# Patient Record
Sex: FEMALE | Race: WHITE | NOT HISPANIC OR LATINO | Employment: FULL TIME | URBAN - METROPOLITAN AREA
[De-identification: names, ages, dates, MRNs, and addresses within clinical notes are randomized per-mention and may not be internally consistent; named-entity substitution may affect disease eponyms.]

---

## 2023-05-24 ENCOUNTER — TELEPHONE (OUTPATIENT)
Dept: PRIMARY CARE | Facility: CLINIC | Age: 33
End: 2023-05-24
Payer: COMMERCIAL

## 2023-06-19 ENCOUNTER — TELEPHONE (OUTPATIENT)
Dept: PRIMARY CARE | Facility: CLINIC | Age: 33
End: 2023-06-19
Payer: COMMERCIAL

## 2023-06-19 DIAGNOSIS — Z00.00 PHYSICAL EXAM: Primary | ICD-10-CM

## 2023-06-19 DIAGNOSIS — E55.9 VITAMIN D DEFICIENCY: ICD-10-CM

## 2023-06-19 DIAGNOSIS — E53.8 VITAMIN B 12 DEFICIENCY: ICD-10-CM

## 2023-06-19 PROBLEM — G89.29 CHRONIC BACK PAIN: Status: ACTIVE | Noted: 2023-06-19

## 2023-06-19 PROBLEM — R55 SYNCOPE: Status: ACTIVE | Noted: 2023-06-19

## 2023-06-19 PROBLEM — L70.9 ACNE: Status: ACTIVE | Noted: 2023-06-19

## 2023-06-19 PROBLEM — K64.9 HEMORRHOIDS: Status: ACTIVE | Noted: 2023-06-19

## 2023-06-19 PROBLEM — D72.819 LEUKOPENIA: Status: ACTIVE | Noted: 2023-06-19

## 2023-06-19 PROBLEM — M54.9 CHRONIC BACK PAIN: Status: ACTIVE | Noted: 2023-06-19

## 2023-06-19 NOTE — TELEPHONE ENCOUNTER
PATIENT HAS AN PHYSICAL ON 6/23/23 AND WOULD LIKE ;LABS ORDERED PRIOR TO THE APPT.      Orders placed in chart   AB

## 2023-06-21 ENCOUNTER — LAB (OUTPATIENT)
Dept: LAB | Facility: LAB | Age: 33
End: 2023-06-21
Payer: COMMERCIAL

## 2023-06-21 DIAGNOSIS — E55.9 VITAMIN D DEFICIENCY: ICD-10-CM

## 2023-06-21 DIAGNOSIS — E53.8 VITAMIN B 12 DEFICIENCY: ICD-10-CM

## 2023-06-21 DIAGNOSIS — Z00.00 PHYSICAL EXAM: ICD-10-CM

## 2023-06-21 LAB
ALANINE AMINOTRANSFERASE (SGPT) (U/L) IN SER/PLAS: 13 U/L (ref 7–45)
ALBUMIN (G/DL) IN SER/PLAS: 4 G/DL (ref 3.4–5)
ALKALINE PHOSPHATASE (U/L) IN SER/PLAS: 32 U/L (ref 33–110)
ANION GAP IN SER/PLAS: 10 MMOL/L (ref 10–20)
ASPARTATE AMINOTRANSFERASE (SGOT) (U/L) IN SER/PLAS: 15 U/L (ref 9–39)
BASOPHILS (10*3/UL) IN BLOOD BY AUTOMATED COUNT: 0.04 X10E9/L (ref 0–0.1)
BASOPHILS/100 LEUKOCYTES IN BLOOD BY AUTOMATED COUNT: 0.9 % (ref 0–2)
BILIRUBIN TOTAL (MG/DL) IN SER/PLAS: 1.5 MG/DL (ref 0–1.2)
CALCIUM (MG/DL) IN SER/PLAS: 8.9 MG/DL (ref 8.6–10.6)
CARBON DIOXIDE, TOTAL (MMOL/L) IN SER/PLAS: 27 MMOL/L (ref 21–32)
CHLORIDE (MMOL/L) IN SER/PLAS: 106 MMOL/L (ref 98–107)
CHOLESTEROL (MG/DL) IN SER/PLAS: 132 MG/DL (ref 0–199)
CHOLESTEROL IN HDL (MG/DL) IN SER/PLAS: 30.7 MG/DL
CHOLESTEROL/HDL RATIO: 4.3
CREATININE (MG/DL) IN SER/PLAS: 0.87 MG/DL (ref 0.5–1.05)
EOSINOPHILS (10*3/UL) IN BLOOD BY AUTOMATED COUNT: 0.04 X10E9/L (ref 0–0.7)
EOSINOPHILS/100 LEUKOCYTES IN BLOOD BY AUTOMATED COUNT: 0.9 % (ref 0–6)
ERYTHROCYTE DISTRIBUTION WIDTH (RATIO) BY AUTOMATED COUNT: 12.5 % (ref 11.5–14.5)
ERYTHROCYTE MEAN CORPUSCULAR HEMOGLOBIN CONCENTRATION (G/DL) BY AUTOMATED: 33.1 G/DL (ref 32–36)
ERYTHROCYTE MEAN CORPUSCULAR VOLUME (FL) BY AUTOMATED COUNT: 92 FL (ref 80–100)
ERYTHROCYTES (10*6/UL) IN BLOOD BY AUTOMATED COUNT: 4 X10E12/L (ref 4–5.2)
GFR FEMALE: 89 ML/MIN/1.73M2
GLUCOSE (MG/DL) IN SER/PLAS: 90 MG/DL (ref 74–99)
HEMATOCRIT (%) IN BLOOD BY AUTOMATED COUNT: 36.9 % (ref 36–46)
HEMOGLOBIN (G/DL) IN BLOOD: 12.2 G/DL (ref 12–16)
IMMATURE GRANULOCYTES/100 LEUKOCYTES IN BLOOD BY AUTOMATED COUNT: 0.2 % (ref 0–0.9)
LDL: 80 MG/DL (ref 0–99)
LEUKOCYTES (10*3/UL) IN BLOOD BY AUTOMATED COUNT: 4.4 X10E9/L (ref 4.4–11.3)
LYMPHOCYTES (10*3/UL) IN BLOOD BY AUTOMATED COUNT: 1.83 X10E9/L (ref 1.2–4.8)
LYMPHOCYTES/100 LEUKOCYTES IN BLOOD BY AUTOMATED COUNT: 42.1 % (ref 13–44)
MONOCYTES (10*3/UL) IN BLOOD BY AUTOMATED COUNT: 0.33 X10E9/L (ref 0.1–1)
MONOCYTES/100 LEUKOCYTES IN BLOOD BY AUTOMATED COUNT: 7.6 % (ref 2–10)
NEUTROPHILS (10*3/UL) IN BLOOD BY AUTOMATED COUNT: 2.1 X10E9/L (ref 1.2–7.7)
NEUTROPHILS/100 LEUKOCYTES IN BLOOD BY AUTOMATED COUNT: 48.3 % (ref 40–80)
NRBC (PER 100 WBCS) BY AUTOMATED COUNT: 0 /100 WBC (ref 0–0)
PLATELETS (10*3/UL) IN BLOOD AUTOMATED COUNT: 204 X10E9/L (ref 150–450)
POTASSIUM (MMOL/L) IN SER/PLAS: 4.4 MMOL/L (ref 3.5–5.3)
PROTEIN TOTAL: 6.8 G/DL (ref 6.4–8.2)
SODIUM (MMOL/L) IN SER/PLAS: 139 MMOL/L (ref 136–145)
TRIGLYCERIDE (MG/DL) IN SER/PLAS: 107 MG/DL (ref 0–149)
UREA NITROGEN (MG/DL) IN SER/PLAS: 16 MG/DL (ref 6–23)
VLDL: 21 MG/DL (ref 0–40)

## 2023-06-21 PROCEDURE — 85025 COMPLETE CBC W/AUTO DIFF WBC: CPT

## 2023-06-21 PROCEDURE — 82306 VITAMIN D 25 HYDROXY: CPT

## 2023-06-21 PROCEDURE — 36415 COLL VENOUS BLD VENIPUNCTURE: CPT

## 2023-06-21 PROCEDURE — 82607 VITAMIN B-12: CPT

## 2023-06-21 PROCEDURE — 84443 ASSAY THYROID STIM HORMONE: CPT

## 2023-06-21 PROCEDURE — 80053 COMPREHEN METABOLIC PANEL: CPT

## 2023-06-21 PROCEDURE — 80061 LIPID PANEL: CPT

## 2023-06-22 LAB
CALCIDIOL (25 OH VITAMIN D3) (NG/ML) IN SER/PLAS: 41 NG/ML
COBALAMIN (VITAMIN B12) (PG/ML) IN SER/PLAS: 303 PG/ML (ref 211–911)
THYROTROPIN (MIU/L) IN SER/PLAS BY DETECTION LIMIT <= 0.05 MIU/L: 1.25 MIU/L (ref 0.44–3.98)

## 2023-06-23 ENCOUNTER — OFFICE VISIT (OUTPATIENT)
Dept: PRIMARY CARE | Facility: CLINIC | Age: 33
End: 2023-06-23
Payer: COMMERCIAL

## 2023-06-23 VITALS
TEMPERATURE: 97.2 F | WEIGHT: 131 LBS | SYSTOLIC BLOOD PRESSURE: 109 MMHG | DIASTOLIC BLOOD PRESSURE: 70 MMHG | HEART RATE: 61 BPM | OXYGEN SATURATION: 100 % | BODY MASS INDEX: 22.31 KG/M2

## 2023-06-23 DIAGNOSIS — Z00.00 PHYSICAL EXAM: Primary | ICD-10-CM

## 2023-06-23 DIAGNOSIS — M79.629 AXILLARY PAIN, UNSPECIFIED LATERALITY: ICD-10-CM

## 2023-06-23 DIAGNOSIS — E53.8 VITAMIN B 12 DEFICIENCY: ICD-10-CM

## 2023-06-23 DIAGNOSIS — E55.9 VITAMIN D DEFICIENCY: ICD-10-CM

## 2023-06-23 PROBLEM — J32.9 SINUSITIS: Status: ACTIVE | Noted: 2023-06-23

## 2023-06-23 PROBLEM — R10.2 PELVIC PAIN IN FEMALE: Status: ACTIVE | Noted: 2023-06-23

## 2023-06-23 PROBLEM — R35.0 URINARY FREQUENCY: Status: ACTIVE | Noted: 2023-06-23

## 2023-06-23 PROBLEM — J06.9 URI (UPPER RESPIRATORY INFECTION): Status: ACTIVE | Noted: 2023-06-23

## 2023-06-23 PROBLEM — H61.20 CERUMEN IMPACTION: Status: ACTIVE | Noted: 2023-06-23

## 2023-06-23 PROBLEM — R05.3 COUGH, PERSISTENT: Status: ACTIVE | Noted: 2023-06-23

## 2023-06-23 PROBLEM — R05.3 CHRONIC COUGH: Status: ACTIVE | Noted: 2023-06-23

## 2023-06-23 PROBLEM — J45.998 POST VIRAL RAD (REACTIVE AIRWAY DISEASE) (HHS-HCC): Status: ACTIVE | Noted: 2023-06-23

## 2023-06-23 PROBLEM — M79.603 UPPER EXTREMITY PAIN: Status: ACTIVE | Noted: 2023-06-23

## 2023-06-23 PROBLEM — R39.9 UTI SYMPTOMS: Status: ACTIVE | Noted: 2023-06-23

## 2023-06-23 PROBLEM — N89.8 VAGINAL DISCHARGE: Status: ACTIVE | Noted: 2023-06-23

## 2023-06-23 PROCEDURE — 1036F TOBACCO NON-USER: CPT | Performed by: FAMILY MEDICINE

## 2023-06-23 PROCEDURE — 99395 PREV VISIT EST AGE 18-39: CPT | Performed by: FAMILY MEDICINE

## 2023-06-23 RX ORDER — DOCUSATE SODIUM 100 MG/1
CAPSULE, LIQUID FILLED ORAL 2 TIMES DAILY PRN
COMMUNITY
Start: 2021-09-03 | End: 2023-06-23 | Stop reason: ALTCHOICE

## 2023-06-23 RX ORDER — BNT162B2 ORIGINAL AND OMICRON BA.4/BA.5 .1125; .1125 MG/2.25ML; MG/2.25ML
INJECTION, SUSPENSION INTRAMUSCULAR
COMMUNITY
Start: 2022-10-19 | End: 2023-06-23 | Stop reason: ALTCHOICE

## 2023-06-23 RX ORDER — INFLUENZA A VIRUS A/DELAWARE/55/2019 CVR-45 (H1N1) ANTIGEN (MDCK CELL DERIVED, PROPIOLACTONE INACTIVATED), INFLUENZA A VIRUS A/DARWIN/11/2021 (H3N2) ANTIGEN (MDCK CELL DERIVED, PROPIOLACTONE INACTIVATED), INFLUENZA B VIRUS B/SINGAPORE/WUH4618/2021 ANTIGEN (MDCK CELL DERIVED, PROPIOLACTONE INACTIVATED), INFLUENZA B VIRUS B/SINGAPORE/INFTT-16-0610/2016 ANTIGEN (MDCK CELL DERIVED, PROPIOLACTONE INACTIVATED) 15; 15; 15; 15 UG/.5ML; UG/.5ML; UG/.5ML; UG/.5ML
INJECTION, SUSPENSION INTRAMUSCULAR
COMMUNITY
Start: 2022-10-19 | End: 2023-06-23 | Stop reason: ALTCHOICE

## 2023-06-23 RX ORDER — ALBUTEROL SULFATE 90 UG/1
AEROSOL, METERED RESPIRATORY (INHALATION)
COMMUNITY
Start: 2022-12-15 | End: 2023-06-23 | Stop reason: ALTCHOICE

## 2023-06-23 RX ORDER — ACETAMINOPHEN 325 MG/1
TABLET ORAL EVERY 6 HOURS
COMMUNITY
Start: 2021-09-03 | End: 2023-06-23 | Stop reason: ALTCHOICE

## 2023-06-23 RX ORDER — DROSPIRENONE AND ETHINYL ESTRADIOL 0.03MG-3MG
KIT ORAL
COMMUNITY
Start: 2016-10-10 | End: 2023-06-23 | Stop reason: ALTCHOICE

## 2023-06-23 RX ORDER — ETHYNODIOL DIACETATE AND ETHINYL ESTRADIOL 1 MG-35MCG
KIT ORAL
COMMUNITY
Start: 2023-06-20 | End: 2023-10-23 | Stop reason: SDUPTHER

## 2023-06-23 RX ORDER — IBUPROFEN/PSEUDOEPHEDRINE HCL 200MG-30MG
3 TABLET ORAL
COMMUNITY
Start: 2015-04-13 | End: 2023-06-23 | Stop reason: ALTCHOICE

## 2023-06-23 RX ORDER — HYDROCORTISONE ACETATE 25 MG/1
25 SUPPOSITORY RECTAL 2 TIMES DAILY
COMMUNITY
Start: 2016-12-22 | End: 2023-06-23 | Stop reason: ALTCHOICE

## 2023-06-23 NOTE — PROGRESS NOTES
Subjective   Patient ID: Komal Gregorio is a 33 y.o. female who presents for Annual Exam (PHYSICAL EXAM).    HPI   She has not been taking any multivitamins. She does not eat a lot of meat. She is only taking a birth control pill. She saw GYN 3 days ago. She is not a smoker and never has been. Her vit D level is normal, she is not on supplement.  Vit B12 level low normal, was on supplement in the past    She states that she does get pain in her armpits. She notes the pain is gone over the past month. She hasn't been lifting weight as much and her main focus is running. She has not felt any lumps or swelling.  Goes away when not working out as much.  Had recent breast exam at gyn which was normal and normal exam today with me.       Review of Systems    Objective   /70   Pulse 61   Temp 36.2 °C (97.2 °F)   Wt 59.4 kg (131 lb)   SpO2 100%   BMI 22.31 kg/m²     Physical Exam  Constitutional:       Appearance: Normal appearance.   HENT:      Head: Normocephalic.      Right Ear: Tympanic membrane normal.      Left Ear: Tympanic membrane normal.      Mouth/Throat:      Pharynx: Oropharynx is clear.   Eyes:      Extraocular Movements: Extraocular movements intact.      Conjunctiva/sclera: Conjunctivae normal.      Pupils: Pupils are equal, round, and reactive to light.   Cardiovascular:      Rate and Rhythm: Normal rate and regular rhythm.      Pulses: Normal pulses.      Heart sounds: Normal heart sounds.   Pulmonary:      Effort: Pulmonary effort is normal.      Breath sounds: Normal breath sounds.   Abdominal:      General: Bowel sounds are normal.      Palpations: Abdomen is soft. There is no mass.      Tenderness: There is no abdominal tenderness.   Musculoskeletal:         General: Normal range of motion.      Cervical back: Normal range of motion and neck supple.      Right lower leg: No edema.      Left lower leg: No edema.   Skin:     General: Skin is warm and dry.   Neurological:       General: No focal deficit present.      Mental Status: She is alert and oriented to person, place, and time.   Psychiatric:         Mood and Affect: Mood normal.         Thought Content: Thought content normal.         Judgment: Judgment normal.     No axillary adenopathy or mass.  No swelling.  Asymptomatic today, no tenderness    Assessment/Plan   Problem List Items Addressed This Visit    Reviewed blood work with from 6/21/23 normal labs    -Start taking a multivitamin.   -Start a B complex  or B12 vitamin a few times a week.   -Wear a supportive bra when running and see if helps pain in axilla.   -Follow up in 1 year or prn sooner.   Problem List Items Addressed This Visit       Vitamin B 12 deficiency    Vitamin D deficiency    Axillary pain, unspecified laterality     Other Visit Diagnoses       Physical exam    -  Primary            Scribe Attestation  By signing my name below, IRomana , Scribe   attest that this documentation has been prepared under the direction and in the presence of Shae Nuñez DO.

## 2023-06-23 NOTE — PATIENT INSTRUCTIONS
-Start taking a multivitamin.   -Start a B complex vitamin a few times a week.   -Wear a supportive bra.   -Follow up in 1 year.

## 2023-10-23 DIAGNOSIS — Z30.41 ENCOUNTER FOR SURVEILLANCE OF CONTRACEPTIVE PILLS: Primary | ICD-10-CM

## 2023-10-23 RX ORDER — ETHYNODIOL DIACETATE AND ETHINYL ESTRADIOL 1 MG-35MCG
1 KIT ORAL DAILY
Qty: 84 TABLET | Refills: 3 | Status: SHIPPED | OUTPATIENT
Start: 2023-10-23 | End: 2024-01-08 | Stop reason: SDUPTHER

## 2023-11-10 ENCOUNTER — TELEPHONE (OUTPATIENT)
Dept: PRIMARY CARE | Facility: CLINIC | Age: 33
End: 2023-11-10
Payer: COMMERCIAL

## 2023-11-10 DIAGNOSIS — K64.9 HEMORRHOIDS, UNSPECIFIED HEMORRHOID TYPE: Primary | ICD-10-CM

## 2023-11-10 NOTE — TELEPHONE ENCOUNTER
Patient would like referral for gastroenterologist for her hemorrhoids she would like a female doctor if possible.

## 2023-11-15 ENCOUNTER — TELEPHONE (OUTPATIENT)
Dept: PRIMARY CARE | Facility: CLINIC | Age: 33
End: 2023-11-15
Payer: COMMERCIAL

## 2023-11-15 DIAGNOSIS — K64.9 ACUTE HEMORRHOID: ICD-10-CM

## 2023-11-15 NOTE — TELEPHONE ENCOUNTER
Pt called about another gastro referral, the provider you referred her to isn't taking anymore new pts. Put another referral to gastro in her chart and provided her with central scheduling's number to call. Told her to call back if has any other issues.

## 2023-11-28 ENCOUNTER — OFFICE VISIT (OUTPATIENT)
Dept: GASTROENTEROLOGY | Facility: CLINIC | Age: 33
End: 2023-11-28
Payer: COMMERCIAL

## 2023-11-28 ENCOUNTER — APPOINTMENT (OUTPATIENT)
Dept: GASTROENTEROLOGY | Facility: CLINIC | Age: 33
End: 2023-11-28
Payer: COMMERCIAL

## 2023-11-28 VITALS — BODY MASS INDEX: 22.71 KG/M2 | HEIGHT: 64 IN | WEIGHT: 133 LBS | HEART RATE: 78 BPM

## 2023-11-28 DIAGNOSIS — K60.2 ANAL FISSURE: Primary | ICD-10-CM

## 2023-11-28 PROCEDURE — 99214 OFFICE O/P EST MOD 30 MIN: CPT | Performed by: INTERNAL MEDICINE

## 2023-11-28 PROCEDURE — 1036F TOBACCO NON-USER: CPT | Performed by: INTERNAL MEDICINE

## 2023-11-28 RX ORDER — SPIRONOLACTONE 25 MG/1
TABLET ORAL
COMMUNITY
Start: 2023-09-27

## 2023-11-28 RX ORDER — TRETINOIN 0.5 MG/G
CREAM TOPICAL
COMMUNITY
Start: 2023-09-27

## 2023-11-28 RX ORDER — NITROGLYCERIN 4 MG/G
1 OINTMENT RECTAL EVERY 12 HOURS SCHEDULED
Qty: 30 G | Refills: 1 | Status: SHIPPED | OUTPATIENT
Start: 2023-11-28

## 2023-11-28 NOTE — PROGRESS NOTES
Subjective     History of Present Illness:   Komal Gregorio is a 33 y.o. female with PMHx of hemorrhoids who presents to GI clinic for follow up.  Has anal pain with BM's, occasional BRB on stool, has anal/rectal pain after intercourse.  Stools are variable in consistency, has BM daily, eats fiber, took Miralax which gave her loose stools, abd pain.  Good appetite, no weight loss.     Review of Systems  Review of Systems    Allergies  Allergies   Allergen Reactions   • Other Other       Medications  Current Outpatient Medications   Medication Instructions   • Kelnor 1/35, 28, 1-35 mg-mcg tablet 1 tablet, oral, Daily   • spironolactone (Aldactone) 25 mg tablet TAKE 1 TO 2 TABLETS BY MOUTH EVERY DAY AS TOLERATED   • tretinoin (Retin-A) 0.05 % cream APPLY TO AFFECTED AREA OF ACNE AT BEDTIME        Objective   Visit Vitals  Pulse 78      Physical Exam    100/62  Lungs clear  Cv  rrr, no mrg  Abd soft nontender  Rectal - visible fissure in posterior anal canal - no mass on digital exam      Lab Results   Component Value Date    WBC 4.4 06/21/2023    WBC 5.2 07/28/2022    WBC 4.4 12/23/2021    HGB 12.2 06/21/2023    HGB 13.3 07/28/2022    HGB 13.3 12/23/2021    HCT 36.9 06/21/2023    HCT 41.1 07/28/2022    HCT 39.2 12/23/2021     06/21/2023     07/28/2022     12/23/2021     Lab Results   Component Value Date     06/21/2023     07/28/2022     12/23/2021    K 4.4 06/21/2023    K 4.3 07/28/2022    K 4.0 12/23/2021     06/21/2023     (H) 07/28/2022     12/23/2021    CO2 27 06/21/2023    CO2 25 07/28/2022    CO2 29 12/23/2021    BUN 16 06/21/2023    BUN 13 07/28/2022    BUN 18 12/23/2021    CREATININE 0.87 06/21/2023    CREATININE 0.83 07/28/2022    CREATININE 0.83 12/23/2021    CALCIUM 8.9 06/21/2023    CALCIUM 8.9 07/28/2022    CALCIUM 9.1 12/23/2021    PROT 6.8 06/21/2023    PROT 6.9 07/28/2022    PROT 6.9 12/23/2021    BILITOT 1.5 (H) 06/21/2023     BILITOT 1.7 (H) 07/28/2022    BILITOT 1.9 (H) 12/23/2021    ALKPHOS 32 (L) 06/21/2023    ALKPHOS 34 07/28/2022    ALKPHOS 55 12/23/2021    ALT 13 06/21/2023    ALT 9 07/28/2022    ALT 14 12/23/2021    AST 15 06/21/2023    AST 15 07/28/2022    AST 16 12/23/2021    GLUCOSE 90 06/21/2023    GLUCOSE 85 07/28/2022    GLUCOSE 85 12/23/2021    CHOL 132 06/21/2023    CHOL 138 07/28/2022    CHOL 187 12/23/2021    LDLF 80 06/21/2023    LDLF 75 07/28/2022    LDLF 124 (H) 12/23/2021    CHHDL 4.3 06/21/2023    CHHDL 3.8 07/28/2022    CHHDL 4.5 12/23/2021     XR chest 2 view  MRN: [id number]  Patient Name: EMBER OSORIO     STUDY:  TH CHEST 2 VIEW PA AND LAT     INDICATION:  cough for 3 weeks  R05.3: Cough, persistent.     COMPARISON:  May 22, 2017     ACCESSION NUMBER(S):  [id number]     ORDERING CLINICIAN:  MAYA RIVERA     FINDINGS:  No consolidation, effusion, edema, or pneumothorax. Heart size within  normal limits.     IMPRESSION:  No evidence of acute intrathoracic abnormality.           Ember Gregorio is a 33 y.o. female for follow up of anal pain, bleeding - has fissure on exam - will rx with Rectiv cream, return in 2-3 weeks       Aiden Palomares MD

## 2023-12-05 ENCOUNTER — TELEPHONE (OUTPATIENT)
Dept: GASTROENTEROLOGY | Facility: CLINIC | Age: 33
End: 2023-12-05
Payer: COMMERCIAL

## 2023-12-05 NOTE — TELEPHONE ENCOUNTER
----- Message from Miriam Schultz MA sent at 12/5/2023 11:09 AM EST -----  Please call her medication that you called in is too expensive @ [phone]

## 2023-12-07 ENCOUNTER — TELEPHONE (OUTPATIENT)
Dept: GASTROENTEROLOGY | Facility: CLINIC | Age: 33
End: 2023-12-07
Payer: COMMERCIAL

## 2023-12-07 NOTE — TELEPHONE ENCOUNTER
----- Message from Juanita Loaiza CMA sent at 12/7/2023  2:30 PM EST -----  You called in Nifedipine for her, the pharmacist stated it is compounded and they can't do, so either change drug or pharmacy

## 2023-12-07 NOTE — TELEPHONE ENCOUNTER
Returned her call, left msg - [facility]

## 2023-12-12 ENCOUNTER — APPOINTMENT (OUTPATIENT)
Dept: GASTROENTEROLOGY | Facility: CLINIC | Age: 33
End: 2023-12-12
Payer: COMMERCIAL

## 2024-01-02 ENCOUNTER — OFFICE VISIT (OUTPATIENT)
Dept: GASTROENTEROLOGY | Facility: CLINIC | Age: 34
End: 2024-01-02
Payer: COMMERCIAL

## 2024-01-02 VITALS — HEIGHT: 64 IN | WEIGHT: 140 LBS | HEART RATE: 60 BPM | BODY MASS INDEX: 23.9 KG/M2

## 2024-01-02 DIAGNOSIS — K59.00 CONSTIPATION, UNSPECIFIED CONSTIPATION TYPE: Primary | ICD-10-CM

## 2024-01-02 DIAGNOSIS — K60.2 ANAL FISSURE: ICD-10-CM

## 2024-01-02 PROCEDURE — 1036F TOBACCO NON-USER: CPT | Performed by: INTERNAL MEDICINE

## 2024-01-02 PROCEDURE — 99214 OFFICE O/P EST MOD 30 MIN: CPT | Performed by: INTERNAL MEDICINE

## 2024-01-02 RX ORDER — DOCUSATE SODIUM 100 MG/1
100 CAPSULE, LIQUID FILLED ORAL 2 TIMES DAILY
Qty: 60 CAPSULE | Refills: 2 | Status: SHIPPED | OUTPATIENT
Start: 2024-01-02 | End: 2024-04-19

## 2024-01-02 NOTE — PROGRESS NOTES
Subjective     History of Present Illness:   Komal Gregorio is a 33 y.o. female with PMHx of anal fissure  who presents to GI clinic for follow up.   Overall having less pain - had 2 episodes of pain since - with harder BM - has had bleeding - had BRB on TP - stools sometimes hard to pass - tried Miralax in past, but caused stomach pains -     Review of Systems  Review of Systems    Allergies  No Known Allergies    Medications  Current Outpatient Medications   Medication Instructions    Kelnor 1/35, 28, 1-35 mg-mcg tablet 1 tablet, oral, Daily    nitroglycerin (Rectiv) 0.4 % (w/w) rectal ointment 1 Application, rectal, Every 12 hours scheduled    spironolactone (Aldactone) 25 mg tablet TAKE 1 TO 2 TABLETS BY MOUTH EVERY DAY AS TOLERATED    tretinoin (Retin-A) 0.05 % cream APPLY TO AFFECTED AREA OF ACNE AT BEDTIME        Objective   Visit Vitals  Pulse 60      Physical Exam  Chaperone declined  100/62  Lungs clear  CV rrr, no mrg  Abd - soft nontender  Anus - healing posterior fissure - hemorrhoids visible in anal canal       Lab Results   Component Value Date    WBC 4.4 06/21/2023    WBC 5.2 07/28/2022    WBC 4.4 12/23/2021    HGB 12.2 06/21/2023    HGB 13.3 07/28/2022    HGB 13.3 12/23/2021    HCT 36.9 06/21/2023    HCT 41.1 07/28/2022    HCT 39.2 12/23/2021     06/21/2023     07/28/2022     12/23/2021     Lab Results   Component Value Date     06/21/2023     07/28/2022     12/23/2021    K 4.4 06/21/2023    K 4.3 07/28/2022    K 4.0 12/23/2021     06/21/2023     (H) 07/28/2022     12/23/2021    CO2 27 06/21/2023    CO2 25 07/28/2022    CO2 29 12/23/2021    BUN 16 06/21/2023    BUN 13 07/28/2022    BUN 18 12/23/2021    CREATININE 0.87 06/21/2023    CREATININE 0.83 07/28/2022    CREATININE 0.83 12/23/2021    CALCIUM 8.9 06/21/2023    CALCIUM 8.9 07/28/2022    CALCIUM 9.1 12/23/2021    PROT 6.8 06/21/2023    PROT 6.9 07/28/2022    PROT 6.9  12/23/2021    BILITOT 1.5 (H) 06/21/2023    BILITOT 1.7 (H) 07/28/2022    BILITOT 1.9 (H) 12/23/2021    ALKPHOS 32 (L) 06/21/2023    ALKPHOS 34 07/28/2022    ALKPHOS 55 12/23/2021    ALT 13 06/21/2023    ALT 9 07/28/2022    ALT 14 12/23/2021    AST 15 06/21/2023    AST 15 07/28/2022    AST 16 12/23/2021    GLUCOSE 90 06/21/2023    GLUCOSE 85 07/28/2022    GLUCOSE 85 12/23/2021    CHOL 132 06/21/2023    CHOL 138 07/28/2022    CHOL 187 12/23/2021    LDLF 80 06/21/2023    LDLF 75 07/28/2022    LDLF 124 (H) 12/23/2021    CHHDL 4.3 06/21/2023    CHHDL 3.8 07/28/2022    CHHDL 4.5 12/23/2021     XR chest 2 view  MRN: [id number]  Patient Name: EMBER OSORIO     STUDY:  TH CHEST 2 VIEW PA AND LAT     INDICATION:  cough for 3 weeks  R05.3: Cough, persistent.     COMPARISON:  May 22, 2017     ACCESSION NUMBER(S):  [id number]     ORDERING CLINICIAN:  MAYA RIVERA     FINDINGS:  No consolidation, effusion, edema, or pneumothorax. Heart size within  normal limits.     IMPRESSION:  No evidence of acute intrathoracic abnormality.           Ember Gregorio is a 33 y.o. female for follow up of anal fissure - some improvement with use of nifedipine cream, but still with some episodes of bleeding, pain x 2 episodes - will continue nifedipine, add colace 100 mg bid, increase fiber and fluid intake, return in 1 month - discussed possible need for Rx of hemorrhoids       Aiden Palomares MD

## 2024-01-08 DIAGNOSIS — Z30.41 ENCOUNTER FOR SURVEILLANCE OF CONTRACEPTIVE PILLS: ICD-10-CM

## 2024-01-08 RX ORDER — ETHYNODIOL DIACETATE AND ETHINYL ESTRADIOL 1 MG-35MCG
1 KIT ORAL DAILY
Qty: 84 TABLET | Refills: 3 | Status: SHIPPED | OUTPATIENT
Start: 2024-01-08 | End: 2024-02-28 | Stop reason: SDUPTHER

## 2024-02-13 ENCOUNTER — OFFICE VISIT (OUTPATIENT)
Dept: GASTROENTEROLOGY | Facility: CLINIC | Age: 34
End: 2024-02-13
Payer: COMMERCIAL

## 2024-02-13 VITALS — HEART RATE: 73 BPM | BODY MASS INDEX: 22.49 KG/M2 | HEIGHT: 65 IN | WEIGHT: 135 LBS

## 2024-02-13 DIAGNOSIS — K64.9 HEMORRHOIDS, UNSPECIFIED HEMORRHOID TYPE: Primary | ICD-10-CM

## 2024-02-13 PROCEDURE — 1036F TOBACCO NON-USER: CPT | Performed by: INTERNAL MEDICINE

## 2024-02-13 PROCEDURE — 99214 OFFICE O/P EST MOD 30 MIN: CPT | Performed by: INTERNAL MEDICINE

## 2024-02-13 RX ORDER — HYDROCORTISONE ACETATE 25 MG/1
25 SUPPOSITORY RECTAL DAILY
Qty: 24 SUPPOSITORY | Refills: 1 | Status: SHIPPED | OUTPATIENT
Start: 2024-02-13 | End: 2025-02-12

## 2024-02-13 NOTE — PROGRESS NOTES
"Subjective     History of Present Illness:   Komal Gregorio is a 34 y.o. female with PMHx of anal fissure who presents to GI clinic for follow up - was doing better, with less pain, less bleeding, stools easier to pass on Colace - then had \"GI bug\",with diarrhea, then \"something popped out\", so she applied steroid cream - not much bleeding - still with spasms after sexual intercourse    Review of Systems  Review of Systems    Allergies  No Known Allergies    Medications  Current Outpatient Medications   Medication Instructions    docusate sodium (COLACE) 100 mg, oral, 2 times daily    Kelnor 1/35, 28, 1-35 mg-mcg tablet 1 tablet, oral, Daily    nitroglycerin (Rectiv) 0.4 % (w/w) rectal ointment 1 Application, rectal, Every 12 hours scheduled    spironolactone (Aldactone) 25 mg tablet TAKE 1 TO 2 TABLETS BY MOUTH EVERY DAY AS TOLERATED    tretinoin (Retin-A) 0.05 % cream APPLY TO AFFECTED AREA OF ACNE AT BEDTIME        Objective   Visit Vitals  Pulse 73      Physical Exam  Chaperone declined  BP 92/60  Abd - soft, nontender  Rectal - fissure healed - hemorrhoids visible in anal canal -nontender on digital exam, no nodule palpable     Lab Results   Component Value Date    WBC 4.4 06/21/2023    WBC 5.2 07/28/2022    WBC 4.4 12/23/2021    HGB 12.2 06/21/2023    HGB 13.3 07/28/2022    HGB 13.3 12/23/2021    HCT 36.9 06/21/2023    HCT 41.1 07/28/2022    HCT 39.2 12/23/2021     06/21/2023     07/28/2022     12/23/2021     Lab Results   Component Value Date     06/21/2023     07/28/2022     12/23/2021    K 4.4 06/21/2023    K 4.3 07/28/2022    K 4.0 12/23/2021     06/21/2023     (H) 07/28/2022     12/23/2021    CO2 27 06/21/2023    CO2 25 07/28/2022    CO2 29 12/23/2021    BUN 16 06/21/2023    BUN 13 07/28/2022    BUN 18 12/23/2021    CREATININE 0.87 06/21/2023    CREATININE 0.83 07/28/2022    CREATININE 0.83 12/23/2021    CALCIUM 8.9 06/21/2023    " CALCIUM 8.9 07/28/2022    CALCIUM 9.1 12/23/2021    PROT 6.8 06/21/2023    PROT 6.9 07/28/2022    PROT 6.9 12/23/2021    BILITOT 1.5 (H) 06/21/2023    BILITOT 1.7 (H) 07/28/2022    BILITOT 1.9 (H) 12/23/2021    ALKPHOS 32 (L) 06/21/2023    ALKPHOS 34 07/28/2022    ALKPHOS 55 12/23/2021    ALT 13 06/21/2023    ALT 9 07/28/2022    ALT 14 12/23/2021    AST 15 06/21/2023    AST 15 07/28/2022    AST 16 12/23/2021    GLUCOSE 90 06/21/2023    GLUCOSE 85 07/28/2022    GLUCOSE 85 12/23/2021    CHOL 132 06/21/2023    CHOL 138 07/28/2022    CHOL 187 12/23/2021    LDLF 80 06/21/2023    LDLF 75 07/28/2022    LDLF 124 (H) 12/23/2021    CHHDL 4.3 06/21/2023    CHHDL 3.8 07/28/2022    CHHDL 4.5 12/23/2021     XR chest 2 view  MRN: [id number]  Patient Name: EMBER OSORIO     STUDY:  TH CHEST 2 VIEW PA AND LAT     INDICATION:  cough for 3 weeks  R05.3: Cough, persistent.     COMPARISON:  May 22, 2017     ACCESSION NUMBER(S):  [id number]     ORDERING CLINICIAN:  MAYA RIVERA     FINDINGS:  No consolidation, effusion, edema, or pneumothorax. Heart size within  normal limits.     IMPRESSION:  No evidence of acute intrathoracic abnormality.           Ember Gregorio is a 34 y.o. female for follow up of anal fissure - now healed, but now with hemorrhoids - will rx with anucort hc supp x 3 weeks, follow up appt in march       Aiden Palomares MD

## 2024-02-28 DIAGNOSIS — Z30.41 ENCOUNTER FOR SURVEILLANCE OF CONTRACEPTIVE PILLS: ICD-10-CM

## 2024-02-28 RX ORDER — ETHYNODIOL DIACETATE AND ETHINYL ESTRADIOL 1 MG-35MCG
1 KIT ORAL DAILY
Qty: 84 TABLET | Refills: 3 | Status: SHIPPED | OUTPATIENT
Start: 2024-02-28

## 2024-03-11 ENCOUNTER — OFFICE VISIT (OUTPATIENT)
Dept: GASTROENTEROLOGY | Facility: CLINIC | Age: 34
End: 2024-03-11
Payer: COMMERCIAL

## 2024-03-11 VITALS — HEART RATE: 74 BPM | BODY MASS INDEX: 23.73 KG/M2 | WEIGHT: 139 LBS | HEIGHT: 64 IN

## 2024-03-11 DIAGNOSIS — K60.2 ANAL FISSURE: Primary | ICD-10-CM

## 2024-03-11 PROCEDURE — 99214 OFFICE O/P EST MOD 30 MIN: CPT | Performed by: INTERNAL MEDICINE

## 2024-03-11 PROCEDURE — 1036F TOBACCO NON-USER: CPT | Performed by: INTERNAL MEDICINE

## 2024-03-11 RX ORDER — CLINDAMYCIN PHOSPHATE, BENZOYL PEROXIDE 25; 10 MG/G; MG/G
1 GEL TOPICAL 2 TIMES DAILY
COMMUNITY
Start: 2024-02-23

## 2024-03-11 NOTE — PROGRESS NOTES
Subjective     History of Present Illness:   Komal Gregorio is a 34 y.o. female with PMHx of anal fissure who presents to GI clinic for follow up.  Used suppositories, now with no anal pain, no bleeding.  Some decrease in stool frequency, now every 2-3 days, stools slightly harder.    Review of Systems  Review of Systems    Allergies  No Known Allergies    Medications  Current Outpatient Medications   Medication Instructions    clindamycin-benzoyl peroxide gel 1 Application, Topical, 2 times daily    docusate sodium (COLACE) 100 mg, oral, 2 times daily    hydrocortisone (ANUSOL-HC) 25 mg, rectal, Daily, Take prior to procedure as directed    Ezekiel 1/35, 28, 1-35 mg-mcg tablet 1 tablet, oral, Daily    nitroglycerin (Rectiv) 0.4 % (w/w) rectal ointment 1 Application, rectal, Every 12 hours scheduled    spironolactone (Aldactone) 25 mg tablet TAKE 1 TO 2 TABLETS BY MOUTH EVERY DAY AS TOLERATED    tretinoin (Retin-A) 0.05 % cream APPLY TO AFFECTED AREA OF ACNE AT BEDTIME        Objective   Visit Vitals  Pulse 74      Physical Exam  Chaperone declined again  BP 90/60    Lungs clear  CV rrr, no mrg  Abd soft, nontender  Rectal - healed fissure, very small hemorrhoids in anal canal       Lab Results   Component Value Date    WBC 4.4 06/21/2023    WBC 5.2 07/28/2022    WBC 4.4 12/23/2021    HGB 12.2 06/21/2023    HGB 13.3 07/28/2022    HGB 13.3 12/23/2021    HCT 36.9 06/21/2023    HCT 41.1 07/28/2022    HCT 39.2 12/23/2021     06/21/2023     07/28/2022     12/23/2021     Lab Results   Component Value Date     06/21/2023     07/28/2022     12/23/2021    K 4.4 06/21/2023    K 4.3 07/28/2022    K 4.0 12/23/2021     06/21/2023     (H) 07/28/2022     12/23/2021    CO2 27 06/21/2023    CO2 25 07/28/2022    CO2 29 12/23/2021    BUN 16 06/21/2023    BUN 13 07/28/2022    BUN 18 12/23/2021    CREATININE 0.87 06/21/2023    CREATININE 0.83 07/28/2022     CREATININE 0.83 12/23/2021    CALCIUM 8.9 06/21/2023    CALCIUM 8.9 07/28/2022    CALCIUM 9.1 12/23/2021    PROT 6.8 06/21/2023    PROT 6.9 07/28/2022    PROT 6.9 12/23/2021    BILITOT 1.5 (H) 06/21/2023    BILITOT 1.7 (H) 07/28/2022    BILITOT 1.9 (H) 12/23/2021    ALKPHOS 32 (L) 06/21/2023    ALKPHOS 34 07/28/2022    ALKPHOS 55 12/23/2021    ALT 13 06/21/2023    ALT 9 07/28/2022    ALT 14 12/23/2021    AST 15 06/21/2023    AST 15 07/28/2022    AST 16 12/23/2021    GLUCOSE 90 06/21/2023    GLUCOSE 85 07/28/2022    GLUCOSE 85 12/23/2021    CHOL 132 06/21/2023    CHOL 138 07/28/2022    CHOL 187 12/23/2021    LDLF 80 06/21/2023    LDLF 75 07/28/2022    LDLF 124 (H) 12/23/2021    CHHDL 4.3 06/21/2023    CHHDL 3.8 07/28/2022    CHHDL 4.5 12/23/2021     XR chest 2 view  MRN: [id number]  Patient Name: EMBER OSORIO     STUDY:  TH CHEST 2 VIEW PA AND LAT     INDICATION:  cough for 3 weeks  R05.3: Cough, persistent.     COMPARISON:  May 22, 2017     ACCESSION NUMBER(S):  [id number]     ORDERING CLINICIAN:  MAYA RIVERA     FINDINGS:  No consolidation, effusion, edema, or pneumothorax. Heart size within  normal limits.     IMPRESSION:  No evidence of acute intrathoracic abnormality.           Ember Gregorio is a 34 y.o. female for follow up of fissure and hemorrhoids.   Overall better, with no anal pain or bleeding.   Will increase Colace to 3/day to soften stools.  Call me if pain or bleeding recur      Aiden Palomares MD

## 2024-04-19 DIAGNOSIS — K59.00 CONSTIPATION, UNSPECIFIED CONSTIPATION TYPE: ICD-10-CM

## 2024-04-19 RX ORDER — DOCUSATE SODIUM 100 MG/1
100 CAPSULE, LIQUID FILLED ORAL 2 TIMES DAILY
Qty: 180 CAPSULE | Refills: 2 | Status: SHIPPED | OUTPATIENT
Start: 2024-04-19

## 2024-05-03 ENCOUNTER — TELEPHONE (OUTPATIENT)
Dept: OBSTETRICS AND GYNECOLOGY | Facility: CLINIC | Age: 34
End: 2024-05-03
Payer: COMMERCIAL

## 2024-05-03 DIAGNOSIS — R39.9 UTI SYMPTOMS: Primary | ICD-10-CM

## 2024-05-03 RX ORDER — NITROFURANTOIN 25; 75 MG/1; MG/1
100 CAPSULE ORAL 2 TIMES DAILY
Qty: 14 CAPSULE | Refills: 0 | Status: SHIPPED | OUTPATIENT
Start: 2024-05-03 | End: 2024-05-10

## 2024-05-03 NOTE — TELEPHONE ENCOUNTER
Pt would like something called into pharmacy for uti symptoms.  Please advise thank you.  Michelle Levine MA

## 2024-06-25 ENCOUNTER — APPOINTMENT (OUTPATIENT)
Dept: OBSTETRICS AND GYNECOLOGY | Facility: CLINIC | Age: 34
End: 2024-06-25
Payer: COMMERCIAL

## 2024-06-25 VITALS — BODY MASS INDEX: 23.86 KG/M2 | WEIGHT: 139 LBS | DIASTOLIC BLOOD PRESSURE: 74 MMHG | SYSTOLIC BLOOD PRESSURE: 104 MMHG

## 2024-06-25 DIAGNOSIS — Z30.41 ENCOUNTER FOR SURVEILLANCE OF CONTRACEPTIVE PILLS: ICD-10-CM

## 2024-06-25 DIAGNOSIS — R35.0 URINARY FREQUENCY: ICD-10-CM

## 2024-06-25 DIAGNOSIS — Z01.419 ENCOUNTER FOR GYNECOLOGICAL EXAMINATION WITHOUT ABNORMAL FINDING: Primary | ICD-10-CM

## 2024-06-25 PROCEDURE — 1036F TOBACCO NON-USER: CPT | Performed by: OBSTETRICS & GYNECOLOGY

## 2024-06-25 PROCEDURE — 99395 PREV VISIT EST AGE 18-39: CPT | Performed by: OBSTETRICS & GYNECOLOGY

## 2024-06-25 RX ORDER — ETHYNODIOL DIACETATE AND ETHINYL ESTRADIOL 1 MG-35MCG
1 KIT ORAL DAILY
Qty: 84 TABLET | Refills: 3 | Status: SHIPPED | OUTPATIENT
Start: 2024-06-25

## 2024-06-25 NOTE — PROGRESS NOTES
Subjective   Komal Gregorio is a 34 y.o. female here for a routine exam. Current complaints: She is on Kelnor for birth control but has noticed some cycle irregularity.  She did skip menses in May.  She is currently on her menses that started today, .    There is no pelvic pain, no dysuria, no change in bowel habits or vaginal discharge.    We discussed some UTI symptoms in May 2024, was mainly urinary frequency.  We discussed that UTI will often have a urinary frequency without urine present,  pressure.  Urinalysis was ordered for the lab if she has symptoms..   Personal health questionnaire reviewed: yes.     Gynecologic History  Patient's last menstrual period was 2024 (exact date).  Contraception: OCP (estrogen/progesterone)  Last Pap: 22. Results were: normal  Last mammogram: n/a. Results were: normal    Obstetric History  OB History    Para Term  AB Living   2 2           SAB IAB Ectopic Multiple Live Births                  # Outcome Date GA Lbr Jose Alfredo/2nd Weight Sex Delivery Anes PTL Lv   2 Para            1 Para                Objective   Constitutional: Alert and in no acute distress. Well developed, well nourished.   Head and Face: Head and face: Normal.    Eyes: Normal external exam - nonicteric sclera, extraocular movements intact (EOMI) and no ptosis.   Neck: No neck asymmetry. Supple. Thyroid not enlarged and there were no palpable thyroid nodules.    Pulmonary: No respiratory distress.   Chest: Breasts: Normal appearance, no nipple discharge and no skin changes. Palpation of breasts and axillae: No palpable mass and no axillary lymphadenopathy.   Abdomen: Soft nontender; no abdominal mass palpated. No organomegaly. No hernias.   Genitourinary: External genitalia: Normal. No inguinal lymphadenopathy. Bartholin's Urethral and Skenes Glands: Normal. Urethra: Normal.  Bladder: Normal on palpation. Vagina: Normal. Cervix: Normal.  Uterus: Normal.  Right  Adnexa/parametria: Normal.  Left Adnexa/parametria: Normal.  Inspection of Perianal Area: Normal.   Musculoskeletal: No joint swelling seen, normal movements of all extremities.   Skin: Normal skin color and pigmentation, normal skin turgor, and no rash.   Neurologic: Non-focal. Grossly intact.   Psychiatric: Alert and oriented x 3. Affect normal to patient baseline. Mood: Appropriate.  Physical Exam     Assessment/Plan   Healthy female exam.  This is a 34-year-old female with a normal exam.  No Pap was sent, she is high risk HPV negative.    We discussed occasional urine symptoms, urinalysis was ordered to the lab to use as needed.  A refill for calendar was ordered to her local pharmacy for 1 pack and then [facility] for the year.    I will see her in 1 year.  Contraception: OCP (estrogen/progesterone).

## 2024-08-02 ENCOUNTER — APPOINTMENT (OUTPATIENT)
Dept: PRIMARY CARE | Facility: CLINIC | Age: 34
End: 2024-08-02
Payer: COMMERCIAL

## 2024-08-02 ENCOUNTER — TELEPHONE (OUTPATIENT)
Dept: PRIMARY CARE | Facility: CLINIC | Age: 34
End: 2024-08-02

## 2024-08-02 NOTE — TELEPHONE ENCOUNTER
PT STATES THAT SHE HAD AN APT FRIDAY 8/2 THAT WAS CX. SHE NEED AN APT IN AUGUST DUE TO [employer] COVERAGE. I DO NOT SEE ANY AVAILABILITY FOR HER

## 2024-08-12 ENCOUNTER — APPOINTMENT (OUTPATIENT)
Dept: PRIMARY CARE | Facility: CLINIC | Age: 34
End: 2024-08-12
Payer: COMMERCIAL

## 2024-08-12 VITALS
BODY MASS INDEX: 23.9 KG/M2 | HEIGHT: 64 IN | DIASTOLIC BLOOD PRESSURE: 68 MMHG | WEIGHT: 140 LBS | OXYGEN SATURATION: 100 % | SYSTOLIC BLOOD PRESSURE: 102 MMHG | HEART RATE: 67 BPM

## 2024-08-12 DIAGNOSIS — F32.A MILD DEPRESSION: ICD-10-CM

## 2024-08-12 DIAGNOSIS — F41.1 ANXIETY, GENERALIZED: ICD-10-CM

## 2024-08-12 DIAGNOSIS — Z00.00 PHYSICAL EXAM: Primary | ICD-10-CM

## 2024-08-12 DIAGNOSIS — E53.8 VITAMIN B 12 DEFICIENCY: ICD-10-CM

## 2024-08-12 DIAGNOSIS — E55.9 VITAMIN D DEFICIENCY: ICD-10-CM

## 2024-08-12 PROBLEM — J45.998 POST VIRAL RAD (REACTIVE AIRWAY DISEASE) (HHS-HCC): Status: RESOLVED | Noted: 2023-06-23 | Resolved: 2024-08-12

## 2024-08-12 PROCEDURE — 1036F TOBACCO NON-USER: CPT | Performed by: FAMILY MEDICINE

## 2024-08-12 PROCEDURE — 99213 OFFICE O/P EST LOW 20 MIN: CPT | Performed by: FAMILY MEDICINE

## 2024-08-12 PROCEDURE — 99395 PREV VISIT EST AGE 18-39: CPT | Performed by: FAMILY MEDICINE

## 2024-08-12 PROCEDURE — 3008F BODY MASS INDEX DOCD: CPT | Performed by: FAMILY MEDICINE

## 2024-08-12 ASSESSMENT — PATIENT HEALTH QUESTIONNAIRE - PHQ9
SUM OF ALL RESPONSES TO PHQ9 QUESTIONS 1 AND 2: 0
1. LITTLE INTEREST OR PLEASURE IN DOING THINGS: NOT AT ALL
2. FEELING DOWN, DEPRESSED OR HOPELESS: NOT AT ALL

## 2024-08-12 ASSESSMENT — ENCOUNTER SYMPTOMS
DEPRESSION: 0
OCCASIONAL FEELINGS OF UNSTEADINESS: 0
LOSS OF SENSATION IN FEET: 0

## 2024-08-12 NOTE — PROGRESS NOTES
Subjective   Patient ID: Komal Gregorio is a 34 y.o. female who presents for Annual Exam (YEARLY PHYSICAL).    HPI   The patient presents to the clinic for an annual physical exam. She has past medical history of vitamin D deficiency, vitamin B12 deficiency    The patient reports that she had been suffering from pain under the rib (right-sided close to the rib border) approximately ~1-2 months ago. She states that this pain lasted for ~3 weeks, never severe. She denies any associated nausea, vomiting, chills, and/or fever. Recently, she states that these pain symptoms have resolved and has not reoccurred.  She denies any new activities that could have caused this.  She has special needs 3 year old and a 5 year old who she lifts and carries at times.  Has not exercised for 3 months approximately and was exercising regularly prio.     The patient reports that she has been very stressed recently (special needs child and stress with other family members). She is sleeping mostly well at night. Her appetite is normal. She states that her mood is relatively good and she does enjoy activities at times.  She is doing some better now than she was 1 month ago.  She inquires about treatment options for mild depression/anxiety symptoms. Discussed counseling with her and she thinks that she could benefit with parenting counseling such that she can learn to be a better parent for her special needs child.  She does note that her child is starting specialized pre-school in the near future so her symptoms may improve in the future and hoping this will be helpful.    She continues on Kelnor birth control medication. She consults with an OB-GYN (Dr. Corrigan) for refills of this medication. Her most recent gynecological exam was done in February 2022.    She takes spironolactone 25 mg daily PRN and tretinoin 0.05 % cream for treatment of acne.    Her most recent labs were done in June 2023. Her labs were mostly  "normal at the time. Her vitamin B12 (303) was on the lower end of normal range. She does not take a regular vitamin B12 supplement at this time.    She works as a . She has 2 children (3-year old and 5-year old).  Job is stressful    She has not exercised regularly for the past few months.    Review of Systems    Objective   /68   Pulse 67   Ht 1.626 m (5' 4\")   Wt 63.5 kg (140 lb)   SpO2 100%   BMI 24.03 kg/m²     Physical Exam  Constitutional:       Appearance: Normal appearance.   HENT:      Head: Normocephalic.      Right Ear: Tympanic membrane normal.      Left Ear: Tympanic membrane normal.      Mouth/Throat:      Pharynx: Oropharynx is clear.   Neck:      Comments: No thyromegaly  Cardiovascular:      Rate and Rhythm: Normal rate and regular rhythm.      Pulses: Normal pulses.      Heart sounds: Normal heart sounds.   Pulmonary:      Effort: Pulmonary effort is normal.      Breath sounds: Normal breath sounds.      Comments: No tenderness or abnormality loer R anterior rib margin where pt had previous pain  Chest:      Chest wall: No tenderness.   Abdominal:      General: Bowel sounds are normal.      Palpations: Abdomen is soft. There is no mass.      Tenderness: There is no abdominal tenderness.   Musculoskeletal:         General: No swelling or deformity. Normal range of motion.      Cervical back: Normal range of motion.   Lymphadenopathy:      Cervical: No cervical adenopathy.   Skin:     General: Skin is warm and dry.   Neurological:      General: No focal deficit present.      Mental Status: She is alert and oriented to person, place, and time.   Psychiatric:         Mood and Affect: Mood normal.         Thought Content: Thought content normal.         Judgment: Judgment normal.         Assessment/Plan   Problem List Items Addressed This Visit             ICD-10-CM    Vitamin B 12 deficiency E53.8    Vitamin D deficiency E55.9    Relevant Orders    Vitamin D 25-Hydroxy,Total (for eval " of Vitamin D levels)    Anxiety, generalized F41.1    Mild depression F32.A     Other Visit Diagnoses         Codes    Physical exam    -  Primary Z00.00    Relevant Orders    CBC and Auto Differential    Comprehensive Metabolic Panel    Lipid Panel    TSH with reflex to Free T4 if abnormal               Labs (CBC, CMP, lipid panel, TSH) were ordered for the patient. She intends to complete her labs soon. The clinic will contact the patient upon receiving her lab results.    In regards to concerns with stress/anxiety/depression, treatment options (counseling sessions, medication, etc) were discussed with the patient. The patient thinks that her symptoms are fairly controlled now, but she just wants to have these resources prepared in the future. Her symptoms may further improve once her child begins their specialized pre-school. She will continue to explore her options for treatment of these symptoms. For now, she was instructed to continue monitoring symptoms for improvement/exacerbation.  She was given several groups who offer counseling.  Did suggest she talk to the specialized  her daughter is starting and they may be able to give her names of counselors that have more experience with helping parents with special needs children.  She will let me know if needs referral when she finds who she is going to see    In regards to concerns with pain under the rib (right-sided anterior close to rib border), the patient was informed that she was likely suffering from a muscular strain previously. Nevertheless, she was instructed to continue monitoring symptoms for improvement/exacerbation.  See back if reoccurs or other s/s    A CPE was conducted on the patient in the clinic today.     Pt will follow up yearly for CPE.  Sooner if feels needs to try medication for anxiety/depression or needs other treatment    Scribe Attestation  By signing my name below, I, Bettina Walls , Scribe   attest that this documentation  has been prepared under the direction and in the presence of Shae Nuñez DO.

## 2024-08-15 ENCOUNTER — LAB (OUTPATIENT)
Dept: LAB | Facility: LAB | Age: 34
End: 2024-08-15
Payer: COMMERCIAL

## 2024-08-15 DIAGNOSIS — E55.9 VITAMIN D DEFICIENCY: ICD-10-CM

## 2024-08-15 DIAGNOSIS — Z00.00 PHYSICAL EXAM: ICD-10-CM

## 2024-08-15 LAB
25(OH)D3 SERPL-MCNC: 42 NG/ML (ref 30–100)
ALBUMIN SERPL BCP-MCNC: 4 G/DL (ref 3.4–5)
ALP SERPL-CCNC: 35 U/L (ref 33–110)
ALT SERPL W P-5'-P-CCNC: 10 U/L (ref 7–45)
ANION GAP SERPL CALC-SCNC: <7 MMOL/L (ref 10–20)
AST SERPL W P-5'-P-CCNC: 15 U/L (ref 9–39)
BASOPHILS # BLD AUTO: 0.04 X10*3/UL (ref 0–0.1)
BASOPHILS NFR BLD AUTO: 0.8 %
BILIRUB SERPL-MCNC: 1.4 MG/DL (ref 0–1.2)
BUN SERPL-MCNC: 16 MG/DL (ref 6–23)
CALCIUM SERPL-MCNC: 8.6 MG/DL (ref 8.6–10.6)
CHLORIDE SERPL-SCNC: 106 MMOL/L (ref 98–107)
CHOLEST SERPL-MCNC: 165 MG/DL (ref 0–199)
CHOLESTEROL/HDL RATIO: 3.8
CO2 SERPL-SCNC: 30 MMOL/L (ref 21–32)
CREAT SERPL-MCNC: 0.83 MG/DL (ref 0.5–1.05)
EGFRCR SERPLBLD CKD-EPI 2021: >90 ML/MIN/1.73M*2
EOSINOPHIL # BLD AUTO: 0.07 X10*3/UL (ref 0–0.7)
EOSINOPHIL NFR BLD AUTO: 1.4 %
ERYTHROCYTE [DISTWIDTH] IN BLOOD BY AUTOMATED COUNT: 12.3 % (ref 11.5–14.5)
GLUCOSE SERPL-MCNC: 91 MG/DL (ref 74–99)
HCT VFR BLD AUTO: 38.5 % (ref 36–46)
HDLC SERPL-MCNC: 43.7 MG/DL
HGB BLD-MCNC: 12.4 G/DL (ref 12–16)
IMM GRANULOCYTES # BLD AUTO: 0.02 X10*3/UL (ref 0–0.7)
IMM GRANULOCYTES NFR BLD AUTO: 0.4 % (ref 0–0.9)
LDLC SERPL CALC-MCNC: 101 MG/DL
LYMPHOCYTES # BLD AUTO: 2.13 X10*3/UL (ref 1.2–4.8)
LYMPHOCYTES NFR BLD AUTO: 43.3 %
MCH RBC QN AUTO: 30 PG (ref 26–34)
MCHC RBC AUTO-ENTMCNC: 32.2 G/DL (ref 32–36)
MCV RBC AUTO: 93 FL (ref 80–100)
MONOCYTES # BLD AUTO: 0.37 X10*3/UL (ref 0.1–1)
MONOCYTES NFR BLD AUTO: 7.5 %
NEUTROPHILS # BLD AUTO: 2.29 X10*3/UL (ref 1.2–7.7)
NEUTROPHILS NFR BLD AUTO: 46.6 %
NON HDL CHOLESTEROL: 121 MG/DL (ref 0–149)
NRBC BLD-RTO: 0 /100 WBCS (ref 0–0)
PLATELET # BLD AUTO: 216 X10*3/UL (ref 150–450)
POTASSIUM SERPL-SCNC: 4 MMOL/L (ref 3.5–5.3)
PROT SERPL-MCNC: 6.9 G/DL (ref 6.4–8.2)
RBC # BLD AUTO: 4.13 X10*6/UL (ref 4–5.2)
SODIUM SERPL-SCNC: 138 MMOL/L (ref 136–145)
TRIGL SERPL-MCNC: 100 MG/DL (ref 0–149)
TSH SERPL-ACNC: 1.49 MIU/L (ref 0.44–3.98)
VLDL: 20 MG/DL (ref 0–40)
WBC # BLD AUTO: 4.9 X10*3/UL (ref 4.4–11.3)

## 2024-08-15 PROCEDURE — 82306 VITAMIN D 25 HYDROXY: CPT

## 2024-08-15 PROCEDURE — 84443 ASSAY THYROID STIM HORMONE: CPT

## 2024-08-15 PROCEDURE — 80061 LIPID PANEL: CPT

## 2024-08-15 PROCEDURE — 36415 COLL VENOUS BLD VENIPUNCTURE: CPT

## 2024-08-15 PROCEDURE — 85025 COMPLETE CBC W/AUTO DIFF WBC: CPT

## 2024-08-15 PROCEDURE — 80053 COMPREHEN METABOLIC PANEL: CPT

## 2024-08-27 ENCOUNTER — TELEPHONE (OUTPATIENT)
Dept: PRIMARY CARE | Facility: CLINIC | Age: 34
End: 2024-08-27
Payer: COMMERCIAL

## 2024-08-27 NOTE — TELEPHONE ENCOUNTER
----- Message from Shae Nuñez sent at 8/25/2024  4:01 PM EDT -----  Report to patient her blood count is normal.  Her chemistries are normal.  Her cholesterol profile is just slightly elevated but not bad, it was lower the last 2 blood draws.  TSH for thyroid is normal.  Vitamin D is good range and she can stay on same D3 supplement.

## 2024-08-29 ENCOUNTER — APPOINTMENT (OUTPATIENT)
Dept: PRIMARY CARE | Facility: CLINIC | Age: 34
End: 2024-08-29
Payer: COMMERCIAL

## 2024-11-06 ENCOUNTER — PATIENT MESSAGE (OUTPATIENT)
Dept: PRIMARY CARE | Facility: CLINIC | Age: 34
End: 2024-11-06
Payer: COMMERCIAL

## 2024-12-18 ENCOUNTER — PATIENT MESSAGE (OUTPATIENT)
Dept: PRIMARY CARE | Facility: CLINIC | Age: 34
End: 2024-12-18
Payer: COMMERCIAL

## 2024-12-23 DIAGNOSIS — Z30.41 ENCOUNTER FOR SURVEILLANCE OF CONTRACEPTIVE PILLS: ICD-10-CM

## 2024-12-23 RX ORDER — ETHYNODIOL DIACETATE AND ETHINYL ESTRADIOL 1 MG-35MCG
1 KIT ORAL DAILY
Qty: 84 TABLET | Refills: 3 | Status: SHIPPED | OUTPATIENT
Start: 2024-12-23

## 2025-02-14 LAB — BACTERIA UR CULT: NORMAL

## 2025-02-20 DIAGNOSIS — R35.0 URINARY FREQUENCY: Primary | ICD-10-CM

## 2025-02-20 RX ORDER — NITROFURANTOIN 25; 75 MG/1; MG/1
100 CAPSULE ORAL 2 TIMES DAILY
Qty: 14 CAPSULE | Refills: 0 | Status: SHIPPED | OUTPATIENT
Start: 2025-02-20 | End: 2025-02-27

## 2025-02-23 LAB — BACTERIA UR CULT: NORMAL

## 2025-03-12 DIAGNOSIS — Z30.41 ENCOUNTER FOR SURVEILLANCE OF CONTRACEPTIVE PILLS: ICD-10-CM

## 2025-03-12 RX ORDER — ETHYNODIOL DIACETATE AND ETHINYL ESTRADIOL 1 MG-35MCG
1 KIT ORAL DAILY
Qty: 84 TABLET | Refills: 3 | Status: SHIPPED | OUTPATIENT
Start: 2025-03-12

## 2025-03-21 DIAGNOSIS — R39.9 UTI SYMPTOMS: Primary | ICD-10-CM

## 2025-03-21 RX ORDER — SULFAMETHOXAZOLE AND TRIMETHOPRIM 800; 160 MG/1; MG/1
1 TABLET ORAL 2 TIMES DAILY
Qty: 14 TABLET | Refills: 0 | Status: SHIPPED | OUTPATIENT
Start: 2025-03-21 | End: 2025-03-28

## 2025-03-24 LAB — BACTERIA UR CULT: NORMAL

## 2025-06-06 ENCOUNTER — PATIENT MESSAGE (OUTPATIENT)
Dept: PRIMARY CARE | Facility: CLINIC | Age: 35
End: 2025-06-06
Payer: COMMERCIAL

## 2025-06-11 ENCOUNTER — APPOINTMENT (OUTPATIENT)
Dept: PRIMARY CARE | Facility: CLINIC | Age: 35
End: 2025-06-11
Payer: COMMERCIAL

## 2025-06-11 DIAGNOSIS — F32.1 CURRENT MODERATE EPISODE OF MAJOR DEPRESSIVE DISORDER WITHOUT PRIOR EPISODE (MULTI): Primary | ICD-10-CM

## 2025-06-11 DIAGNOSIS — F41.9 ANXIETY: ICD-10-CM

## 2025-06-11 DIAGNOSIS — J45.20 MILD INTERMITTENT REACTIVE AIRWAY DISEASE WITHOUT COMPLICATION (HHS-HCC): ICD-10-CM

## 2025-06-11 PROCEDURE — 1036F TOBACCO NON-USER: CPT | Performed by: FAMILY MEDICINE

## 2025-06-11 PROCEDURE — 99214 OFFICE O/P EST MOD 30 MIN: CPT | Performed by: FAMILY MEDICINE

## 2025-06-11 RX ORDER — ESCITALOPRAM OXALATE 10 MG/1
10 TABLET ORAL DAILY
Qty: 30 TABLET | Refills: 1 | Status: SHIPPED | OUTPATIENT
Start: 2025-06-11

## 2025-06-11 NOTE — PROGRESS NOTES
"Subjective   Patient ID: Komal Gregorio is a 35 y.o. female who presents for No chief complaint on file..    HPI   Patient is having a telemedicine visit today with an acute complain.    She reports that she is having a lot of anxiety/ depression and would like to discuss medication options. She is having health issues with her daughter, who is has epilepsy that has been uncontrollable.  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 She states that she is crying a lot and very depressed over this diagnosis.  Prior she was anxious and overwhelmed with her family duties and job etc.  She denies taking any antidepressant in the past, she reports family history of depression with her mother. She is not doing any counseling or therapy but has been looking for someone that can help her feel better about her daughter's dx and help her deal with all the ramifications of her daughters medical problems.     Pt is sleeping ok at night, wakes up once and occasionally twice but goes back to sleep, states gets about 7-8 hours sleep per night.  Pt denies suicidal ideation.  She does have support system and now w diagnosis she is finally getting daughter into some of the specialists she has needed to see.      She continues on Kelnor birth control medication. She consults with an OB-GYN (Dr. Corrigan) for refills of this medication.     She takes spironolactone 25 mg daily PRN and tretinoin 0.05 % cream for treatment of acne.      Review of Systems    Objective   There were no vitals taken for this visit.  Virtual visit    Physical Exam  Constitutional:       Appearance: Normal appearance.      Comments: Pt is tearful during much of her visit   Pulmonary:      Effort: Pulmonary effort is normal.   Neurological:      Mental Status: She is alert.   Psychiatric:         Thought Content: Thought content " normal.         Judgment: Judgment normal.      Comments: Tearful and anxious         Assessment/Plan   Diagnoses and all orders for this visit:  Current moderate episode of major depressive disorder without prior episode (Multi)  Anxiety  -     escitalopram (Lexapro) 10 mg tablet; Take 1 tablet (10 mg) by mouth once daily.  Mild intermittent reactive airway disease without complication ([facility]-H[facility])    Discussed antidepressant medication options. She is unsure what medications her mother used successfully, she thinks her mother was on multiple different meds at different times  Recommended start Lexapro 10 mg once daily.  She will take 1/2 tab for 5-7 days and then go up to whole tab.  SE profile discussed.    Discussed collaborative care program for help with medication and counseling.  Pt will consider.  Made other suggestions to help her find therapist otherwise    Follow-up in 4-6 weeks, call us if needed sooner.         Scribe Attestation  By signing my name below, IErvin , Scribsalinas   attest that this documentation has been prepared under the direction and in the presence of Shae Nuñez DO.

## 2025-06-20 ENCOUNTER — TELEPHONE (OUTPATIENT)
Facility: CLINIC | Age: 35
End: 2025-06-20

## 2025-06-20 DIAGNOSIS — Z30.41 ENCOUNTER FOR SURVEILLANCE OF CONTRACEPTIVE PILLS: ICD-10-CM

## 2025-06-20 RX ORDER — ETHYNODIOL DIACETATE AND ETHINYL ESTRADIOL 1 MG-35MCG
1 KIT ORAL DAILY
Qty: 84 TABLET | Refills: 3 | Status: SHIPPED | OUTPATIENT
Start: 2025-06-20

## 2025-06-20 NOTE — TELEPHONE ENCOUNTER
The patient request a refill of Kelnor 1/35.  The pharmacy present in [facility] is the [facility] in [place]  (not a [employer]).

## 2025-06-20 NOTE — TELEPHONE ENCOUNTER
Received call from patient requesting a one month refill for Kelnor 1/35, 28, 1-35 mg-mcg tablet [[id number]] to hold her over. She has an upcoming appt on 7/1/25. Preferred pharmacy for this refill is [facility] in [place].

## 2025-06-24 ENCOUNTER — APPOINTMENT (OUTPATIENT)
Dept: OBSTETRICS AND GYNECOLOGY | Facility: CLINIC | Age: 35
End: 2025-06-24
Payer: COMMERCIAL

## 2025-07-01 ENCOUNTER — APPOINTMENT (OUTPATIENT)
Facility: CLINIC | Age: 35
End: 2025-07-01
Payer: COMMERCIAL

## 2025-07-01 VITALS
SYSTOLIC BLOOD PRESSURE: 113 MMHG | DIASTOLIC BLOOD PRESSURE: 65 MMHG | WEIGHT: 138.1 LBS | BODY MASS INDEX: 23.58 KG/M2 | HEIGHT: 64 IN

## 2025-07-01 DIAGNOSIS — Z01.419 ENCOUNTER FOR GYNECOLOGICAL EXAMINATION WITHOUT ABNORMAL FINDING: Primary | ICD-10-CM

## 2025-07-01 DIAGNOSIS — Z30.41 ENCOUNTER FOR SURVEILLANCE OF CONTRACEPTIVE PILLS: ICD-10-CM

## 2025-07-01 DIAGNOSIS — R35.0 URINARY FREQUENCY: ICD-10-CM

## 2025-07-01 DIAGNOSIS — R10.2 PELVIC PAIN IN FEMALE: ICD-10-CM

## 2025-07-01 PROCEDURE — 3008F BODY MASS INDEX DOCD: CPT | Performed by: OBSTETRICS & GYNECOLOGY

## 2025-07-01 PROCEDURE — 88175 CYTOPATH C/V AUTO FLUID REDO: CPT

## 2025-07-01 PROCEDURE — 1036F TOBACCO NON-USER: CPT | Performed by: OBSTETRICS & GYNECOLOGY

## 2025-07-01 PROCEDURE — 99395 PREV VISIT EST AGE 18-39: CPT | Performed by: OBSTETRICS & GYNECOLOGY

## 2025-07-01 PROCEDURE — 87626 HPV SEP HI-RSK TYP&POOL RSLT: CPT

## 2025-07-01 RX ORDER — ETHYNODIOL DIACETATE AND ETHINYL ESTRADIOL 1 MG-35MCG
1 KIT ORAL DAILY
Qty: 84 TABLET | Refills: 3 | Status: SHIPPED | OUTPATIENT
Start: 2025-07-01

## 2025-07-01 ASSESSMENT — PATIENT HEALTH QUESTIONNAIRE - PHQ9
2. FEELING DOWN, DEPRESSED OR HOPELESS: NOT AT ALL
1. LITTLE INTEREST OR PLEASURE IN DOING THINGS: NOT AT ALL
SUM OF ALL RESPONSES TO PHQ9 QUESTIONS 1 & 2: 0

## 2025-07-01 ASSESSMENT — PAIN SCALES - GENERAL: PAINLEVEL_OUTOF10: 0-NO PAIN

## 2025-07-01 NOTE — PROGRESS NOTES
Subjective   Komal Gregorio is a 35 y.o. female here for a routine exam.  She is on birth control pills and has regular cycles.  No discharge, no dysuria or change in bowel habits.   Discussed some urinary symptoms and occasional incontinence.  She frequently has urine cultures that are negative.  We discussed pelvic floor physical therapy.   Her kids are now 5 and 3 years old.    Her mother has history of breast cancer diagnosed at age 60.    Personal health questionnaire reviewed: yes.     Gynecologic History  Patient's last menstrual period was 2025.  Contraception: OCP (estrogen/progesterone)  Last Pap: 22. Results were: normal.    Obstetric History  OB History    Para Term  AB Living   2 2 2   2   SAB IAB Ectopic Multiple Live Births       2      # Outcome Date GA Lbr Jose Alfredo/2nd Weight Sex Type Anes PTL Lv   2 Term      Vag-Spont   PORFIRIO   1 Term      Vag-Spont   PORFIRIO       Objective   Constitutional: Alert and in no acute distress. Well developed, well nourished.   Head and Face: Head and face: Normal.    Eyes: Normal external exam - nonicteric sclera, extraocular movements intact (EOMI) and no ptosis.   Neck: No neck asymmetry. Supple. Thyroid not enlarged and there were no palpable thyroid nodules.    Pulmonary: No respiratory distress.   Chest: Breasts: Normal appearance, no nipple discharge and no skin changes. Palpation of breasts and axillae: No palpable mass and no axillary lymphadenopathy.   Abdomen: Soft nontender; no abdominal mass palpated. No organomegaly. No hernias.   Genitourinary: External genitalia: Normal. No inguinal lymphadenopathy. Bartholin's Urethral and Skenes Glands: Normal. Urethra: Normal.  Bladder: Normal on palpation. Vagina: Normal. Cervix: Normal.  Uterus: Normal.  Right Adnexa/parametria: Normal.  Left Adnexa/parametria: Normal.    Musculoskeletal: No joint swelling seen, normal movements of all extremities.   Skin: Normal skin color and  pigmentation, normal skin turgor, and no rash.   Neurologic: Non-focal. Grossly intact.   Psychiatric: Alert and oriented x 3. Affect normal to patient baseline. Mood: Appropriate.  Physical Exam     Assessment/Plan   Healthy female exam.  This is a 35-year-old female with a normal exam.  A Pap smear was sent.    A refill for her birth control (Kelnor 1/35) was ordered to the [facility] pharmacy.  Referral for pelvic floor physical therapy was placed in [facility].  Will see her routinely in 1 year.  Education reviewed: self breast exams.  Contraception: OCP (estrogen/progesterone).

## 2025-07-17 ASSESSMENT — PROMIS GLOBAL HEALTH SCALE
RATE_SOCIAL_SATISFACTION: VERY GOOD
EMOTIONAL_PROBLEMS: SOMETIMES
RATE_AVERAGE_FATIGUE: SEVERE
CARRYOUT_SOCIAL_ACTIVITIES: GOOD
RATE_MENTAL_HEALTH: FAIR
RATE_PHYSICAL_HEALTH: VERY GOOD
RATE_AVERAGE_PAIN: 2
RATE_QUALITY_OF_LIFE: GOOD
CARRYOUT_PHYSICAL_ACTIVITIES: COMPLETELY
RATE_GENERAL_HEALTH: VERY GOOD

## 2025-07-18 ENCOUNTER — APPOINTMENT (OUTPATIENT)
Dept: PRIMARY CARE | Facility: CLINIC | Age: 35
End: 2025-07-18
Payer: COMMERCIAL

## 2025-07-18 VITALS
DIASTOLIC BLOOD PRESSURE: 57 MMHG | SYSTOLIC BLOOD PRESSURE: 91 MMHG | WEIGHT: 143 LBS | HEIGHT: 65 IN | TEMPERATURE: 98 F | BODY MASS INDEX: 23.82 KG/M2 | OXYGEN SATURATION: 98 % | HEART RATE: 70 BPM

## 2025-07-18 DIAGNOSIS — F41.9 ANXIETY: ICD-10-CM

## 2025-07-18 DIAGNOSIS — E55.9 VITAMIN D INSUFFICIENCY: ICD-10-CM

## 2025-07-18 DIAGNOSIS — Z13.29 SCREENING FOR THYROID DISORDER: ICD-10-CM

## 2025-07-18 DIAGNOSIS — F32.A MILD DEPRESSION: ICD-10-CM

## 2025-07-18 DIAGNOSIS — Z00.00 ANNUAL PHYSICAL EXAM: Primary | ICD-10-CM

## 2025-07-18 DIAGNOSIS — L70.0 ACNE VULGARIS: ICD-10-CM

## 2025-07-18 LAB
CYTOLOGY CMNT CVX/VAG CYTO-IMP: NORMAL
HPV HR 12 DNA GENITAL QL NAA+PROBE: NEGATIVE
HPV HR GENOTYPES PNL CVX NAA+PROBE: NEGATIVE
HPV16 DNA SPEC QL NAA+PROBE: NEGATIVE
HPV18 DNA SPEC QL NAA+PROBE: NEGATIVE
LAB AP CONTRACEPTIVE HISTORY: NORMAL
LAB AP HPV GENOTYPE QUESTION: YES
LAB AP HPV HR: NORMAL
LABORATORY COMMENT REPORT: NORMAL
LMP START DATE: NORMAL
PATH REPORT.TOTAL CANCER: NORMAL

## 2025-07-18 PROCEDURE — 99213 OFFICE O/P EST LOW 20 MIN: CPT | Performed by: FAMILY MEDICINE

## 2025-07-18 PROCEDURE — 3008F BODY MASS INDEX DOCD: CPT | Performed by: FAMILY MEDICINE

## 2025-07-18 PROCEDURE — 99395 PREV VISIT EST AGE 18-39: CPT | Performed by: FAMILY MEDICINE

## 2025-07-18 PROCEDURE — 1036F TOBACCO NON-USER: CPT | Performed by: FAMILY MEDICINE

## 2025-07-18 RX ORDER — ESCITALOPRAM OXALATE 10 MG/1
10 TABLET ORAL DAILY
Qty: 90 TABLET | Refills: 0 | Status: SHIPPED | OUTPATIENT
Start: 2025-07-18

## 2025-07-18 ASSESSMENT — PATIENT HEALTH QUESTIONNAIRE - PHQ9
8. MOVING OR SPEAKING SO SLOWLY THAT OTHER PEOPLE COULD HAVE NOTICED. OR THE OPPOSITE, BEING SO FIGETY OR RESTLESS THAT YOU HAVE BEEN MOVING AROUND A LOT MORE THAN USUAL: NOT AT ALL
5. POOR APPETITE OR OVEREATING: NOT AT ALL
SUM OF ALL RESPONSES TO PHQ9 QUESTIONS 1 AND 2: 1
4. FEELING TIRED OR HAVING LITTLE ENERGY: MORE THAN HALF THE DAYS
3. TROUBLE FALLING OR STAYING ASLEEP OR SLEEPING TOO MUCH: NEARLY EVERY DAY
1. LITTLE INTEREST OR PLEASURE IN DOING THINGS: NOT AT ALL
9. THOUGHTS THAT YOU WOULD BE BETTER OFF DEAD, OR OF HURTING YOURSELF: NOT AT ALL
2. FEELING DOWN, DEPRESSED OR HOPELESS: SEVERAL DAYS
7. TROUBLE CONCENTRATING ON THINGS, SUCH AS READING THE NEWSPAPER OR WATCHING TELEVISION: SEVERAL DAYS
SUM OF ALL RESPONSES TO PHQ QUESTIONS 1-9: 7
6. FEELING BAD ABOUT YOURSELF - OR THAT YOU ARE A FAILURE OR HAVE LET YOURSELF OR YOUR FAMILY DOWN: NOT AT ALL

## 2025-07-18 ASSESSMENT — ANXIETY QUESTIONNAIRES
6. BECOMING EASILY ANNOYED OR IRRITABLE: NOT AT ALL
7. FEELING AFRAID AS IF SOMETHING AWFUL MIGHT HAPPEN: NOT AT ALL
GAD7 TOTAL SCORE: 2
2. NOT BEING ABLE TO STOP OR CONTROL WORRYING: NOT AT ALL
4. TROUBLE RELAXING: SEVERAL DAYS
1. FEELING NERVOUS, ANXIOUS, OR ON EDGE: NOT AT ALL
IF YOU CHECKED OFF ANY PROBLEMS ON THIS QUESTIONNAIRE, HOW DIFFICULT HAVE THESE PROBLEMS MADE IT FOR YOU TO DO YOUR WORK, TAKE CARE OF THINGS AT HOME, OR GET ALONG WITH OTHER PEOPLE: NOT DIFFICULT AT ALL
3. WORRYING TOO MUCH ABOUT DIFFERENT THINGS: SEVERAL DAYS
5. BEING SO RESTLESS THAT IT IS HARD TO SIT STILL: NOT AT ALL

## 2025-07-18 ASSESSMENT — PAIN SCALES - GENERAL: PAINLEVEL_OUTOF10: 0-NO PAIN

## 2025-07-18 NOTE — PROGRESS NOTES
"Subjective   Patient ID: Komal Gregorio is a 35 y.o. female who presents for Annual Exam.    HPI   Patient is in the office today for her Annual Exam.  She is overdue for her new lab tests, and states that she is fasting today. Reviewed labs from 8/2024    She has personal history of depression/ anxiety which is treated with Lexapro 10 mg daily. She takes it in the night, and reports that she is having insomnia with Lexapro, because she wakes up a lot during the middle of the night and has a lot of issues getting back to sleep. She has not tried taking in the morning but has been wondering if will help.  She feels well on the med otherwise, states not crying daily as she was before when her daughter was given \"life changing diagnosis\"  daughter having seizures and difficult to control and genetics showed rare disorder associated with seizures and behavioral disorder also    She takes spironolactone 25 mg daily PRN and tretinoin 0.05 % cream for treatment of acne.    She reports family history of breast cancer with her mother. She talked with her OB GYN and was told that she should start with screening for breast cancer yearly at 38.     Review of Systems    Objective   BP 91/57 (BP Location: Left arm, Patient Position: Sitting, BP Cuff Size: Adult)   Pulse 70   Temp 36.7 °C (98 °F) (Temporal)   Ht 1.651 m (5' 5\")   Wt 64.9 kg (143 lb)   LMP 06/24/2025   SpO2 98%   BMI 23.80 kg/m²     Physical Exam  Constitutional:       Appearance: Normal appearance.   HENT:      Head: Normocephalic.      Right Ear: Tympanic membrane normal.      Left Ear: Tympanic membrane normal.      Nose: Nose normal.      Mouth/Throat:      Pharynx: Oropharynx is clear.     Cardiovascular:      Rate and Rhythm: Normal rate and regular rhythm.      Pulses: Normal pulses.      Heart sounds: Normal heart sounds.   Pulmonary:      Effort: Pulmonary effort is normal.      Breath sounds: Normal breath sounds.   Abdominal:      " General: Bowel sounds are normal.      Palpations: Abdomen is soft. There is no mass.      Tenderness: There is no abdominal tenderness.     Musculoskeletal:         General: Normal range of motion.      Cervical back: Normal range of motion and neck supple.      Right lower leg: No edema.      Left lower leg: No edema.   Lymphadenopathy:      Cervical: No cervical adenopathy.     Skin:     General: Skin is warm and dry.      Findings: No rash.     Neurological:      General: No focal deficit present.      Mental Status: She is alert and oriented to person, place, and time.     Psychiatric:         Mood and Affect: Mood normal.         Thought Content: Thought content normal.         Judgment: Judgment normal.         Assessment/Plan   Diagnoses and all orders for this visit:  Annual physical exam  -     CBC; Future  -     Comprehensive Metabolic Panel; Future  -     Lipid Panel; Future  -     TSH with reflex to Free T4 if abnormal; Future  Anxiety  -     escitalopram (Lexapro) 10 mg tablet; Take 1 tablet (10 mg) by mouth once daily.  Mild depression  Acne vulgaris  Vitamin D insufficiency  -     Vitamin D 25-Hydroxy,Total (for eval of Vitamin D levels); Future  Screening for thyroid disorder  -     TSH with reflex to Free T4 if abnormal; Future    Recommended take Lexapro in the mornings.  If still having problems may change medication, will follow up 3 months to reassess  Recommended perform lab tests today as she is fasting.  Medications refilled today. Instructed to take medications as prescribed.  Discussed importance of finding counselor, gave her multiple names for her to look into.    Follow-up in 3 months, call us if needed sooner. If doing fine on medication follow up in 6 months           Scribe Attestation  By signing my name below, IErvin , Stacey   attest that this documentation has been prepared under the direction and in the presence of Shae Nuñez DO.

## 2025-07-19 LAB
25(OH)D3+25(OH)D2 SERPL-MCNC: 56 NG/ML (ref 30–100)
ALBUMIN SERPL-MCNC: 4.1 G/DL (ref 3.6–5.1)
ALP SERPL-CCNC: 34 U/L (ref 31–125)
ALT SERPL-CCNC: 12 U/L (ref 6–29)
ANION GAP SERPL CALCULATED.4IONS-SCNC: 8 MMOL/L (CALC) (ref 7–17)
AST SERPL-CCNC: 16 U/L (ref 10–30)
BILIRUB SERPL-MCNC: 0.6 MG/DL (ref 0.2–1.2)
BUN SERPL-MCNC: 12 MG/DL (ref 7–25)
CALCIUM SERPL-MCNC: 8.3 MG/DL (ref 8.6–10.2)
CHLORIDE SERPL-SCNC: 105 MMOL/L (ref 98–110)
CHOLEST SERPL-MCNC: 150 MG/DL
CHOLEST/HDLC SERPL: 3.7 (CALC)
CO2 SERPL-SCNC: 26 MMOL/L (ref 20–32)
CREAT SERPL-MCNC: 0.71 MG/DL (ref 0.5–0.97)
EGFRCR SERPLBLD CKD-EPI 2021: 112 ML/MIN/1.73M2
ERYTHROCYTE [DISTWIDTH] IN BLOOD BY AUTOMATED COUNT: 13.8 % (ref 11–15)
GLUCOSE SERPL-MCNC: 79 MG/DL (ref 65–99)
HCT VFR BLD AUTO: 41 % (ref 35–45)
HDLC SERPL-MCNC: 41 MG/DL
HGB BLD-MCNC: 12.9 G/DL (ref 11.7–15.5)
LDLC SERPL CALC-MCNC: 88 MG/DL (CALC)
MCH RBC QN AUTO: 30.6 PG (ref 27–33)
MCHC RBC AUTO-ENTMCNC: 31.5 G/DL (ref 32–36)
MCV RBC AUTO: 97.4 FL (ref 80–100)
NONHDLC SERPL-MCNC: 109 MG/DL (CALC)
PLATELET # BLD AUTO: 162 THOUSAND/UL (ref 140–400)
PMV BLD REES-ECKER: 11 FL (ref 7.5–12.5)
POTASSIUM SERPL-SCNC: 4.3 MMOL/L (ref 3.5–5.3)
PROT SERPL-MCNC: 6.6 G/DL (ref 6.1–8.1)
RBC # BLD AUTO: 4.21 MILLION/UL (ref 3.8–5.1)
SODIUM SERPL-SCNC: 139 MMOL/L (ref 135–146)
TRIGL SERPL-MCNC: 114 MG/DL
TSH SERPL-ACNC: 0.95 MIU/L
WBC # BLD AUTO: 5.5 THOUSAND/UL (ref 3.8–10.8)

## 2025-10-24 ENCOUNTER — APPOINTMENT (OUTPATIENT)
Dept: PRIMARY CARE | Facility: CLINIC | Age: 35
End: 2025-10-24
Payer: COMMERCIAL

## 2026-07-07 ENCOUNTER — APPOINTMENT (OUTPATIENT)
Facility: CLINIC | Age: 36
End: 2026-07-07
Payer: COMMERCIAL